# Patient Record
(demographics unavailable — no encounter records)

---

## 2025-02-04 NOTE — PHYSICAL EXAM
[de-identified] : left knee exam  Skin: Clean, dry, intact Inspection: No obvious malalignment, no masses, no swelling, no effusion. Tenderness: no MJLT. No LJLT. No tenderness over the medial and lateral patella facets. No ttp medial/lateral epicondyle, patella tendon, tibial tubercle, pes anserinus, or proximal fibula. ROM: 0 to 120 no pain with deep flexion in both knees Stability: Stable to varus, valgus, anterior and posterior drawer Strength: 5/5 Q/H/TA/GS/EHL, no atrophy Neuro: In tact to light touch throughout in dp/sp/tib/denise/saph nerve districutions, DTR's normal Pulses: 2+ DP/PT pulses.  right knee exam Skin: Clean, dry, intact Inspection: No obvious malalignment, no masses, no swelling, no effusion. Tenderness: no MJLT. No LJLT. No tenderness over the medial and lateral patella facets. No ttp medial/lateral epicondyle, patella tendon, tibial tubercle, pes anserinus, or proximal fibula. ROM: 0 to 120 no pain with deep flexion in both knees Stability: Stable to varus, valgus, anterior and posterior drawer Strength: 5/5 Q/H/TA/GS/EHL, no atrophy Neuro: In tact to light touch throughout in dp/sp/tib/denise/saph nerve districutions, DTR's normal Pulses: 2+ DP/PT pulses

## 2025-02-04 NOTE — HISTORY OF PRESENT ILLNESS
[de-identified] : 61yo F s/p Left total knee arthroplasty, 6/6/22, R tka 9/20/21.  In terms of her right knee she was doing very well.  No complaints.  In terms of her left knee she does report some pain and swelling.  She has returned to work.  She wears compression sleeves which she does find helpful.  She denies numbness tingling fevers chills [employer] case recently closed

## 2025-02-04 NOTE — DISCUSSION/SUMMARY
[de-identified] : History of total knee arthroplasty. [facility] paperwork was completed at the last visit.  Patient presents today requesting a letter.  A letter was provided.  She may follow-up for annual checkup

## 2025-04-08 NOTE — HISTORY OF PRESENT ILLNESS
[FreeTextEntry1] : Pt presented for follow up after she went to ED for LBP with radiation to the front and down LEs. [de-identified] : She went to ED for eval of low back pain.  She had labs done and CT scan, they were unremarkable.  She was discharged home with Meloxicam and Flexeril, it did help with the pain.  However, the Flexeril made her sleepy.  She had LBP on & off for about a year.  The LBP was worse over the last few days.  No recent fall or injury.   She denied paresthesia or weakness of LEs.  She was concerned about abnormal labs.  Labs from ED showed nonfasting glucose was 112, CO2 was 21& Alk Phos was 130.  The rest of CMP, CBC and UA were all normal.  She has appt with spine/ortho today in the afternoon.

## 2025-04-08 NOTE — PHYSICAL EXAM
[No Acute Distress] : no acute distress [Well Nourished] : well nourished [Well Developed] : well developed [Supple] : supple [No Respiratory Distress] : no respiratory distress  [Clear to Auscultation] : lungs were clear to auscultation bilaterally [Normal Rate] : normal rate  [Regular Rhythm] : with a regular rhythm [Normal S1, S2] : normal S1 and S2 [No Edema] : there was no peripheral edema [Soft] : abdomen soft [Non Tender] : non-tender [Normal Bowel Sounds] : normal bowel sounds [No CVA Tenderness] : no CVA  tenderness [No Spinal Tenderness] : no spinal tenderness [No Joint Swelling] : no joint swelling [Grossly Normal Strength/Tone] : grossly normal strength/tone [Normal Gait] : normal gait [Speech Grossly Normal] : speech grossly normal [Alert and Oriented x3] : oriented to person, place, and time [de-identified] : Obese female in stated age,

## 2025-04-08 NOTE — PHYSICAL EXAM
[No Acute Distress] : no acute distress [Well Nourished] : well nourished [Well Developed] : well developed [Supple] : supple [No Respiratory Distress] : no respiratory distress  [Clear to Auscultation] : lungs were clear to auscultation bilaterally [Normal Rate] : normal rate  [Regular Rhythm] : with a regular rhythm [Normal S1, S2] : normal S1 and S2 [No Edema] : there was no peripheral edema [Soft] : abdomen soft [Non Tender] : non-tender [Normal Bowel Sounds] : normal bowel sounds [No CVA Tenderness] : no CVA  tenderness [No Spinal Tenderness] : no spinal tenderness [No Joint Swelling] : no joint swelling [Grossly Normal Strength/Tone] : grossly normal strength/tone [Normal Gait] : normal gait [Speech Grossly Normal] : speech grossly normal [Alert and Oriented x3] : oriented to person, place, and time [de-identified] : Obese female in stated age,

## 2025-04-08 NOTE — HISTORY OF PRESENT ILLNESS
[FreeTextEntry1] : Pt presented for follow up after she went to ED for LBP with radiation to the front and down LEs. [de-identified] : She went to ED for eval of low back pain.  She had labs done and CT scan, they were unremarkable.  She was discharged home with Meloxicam and Flexeril, it did help with the pain.  However, the Flexeril made her sleepy.  She had LBP on & off for about a year.  The LBP was worse over the last few days.  No recent fall or injury.   She denied paresthesia or weakness of LEs.  She was concerned about abnormal labs.  Labs from ED showed nonfasting glucose was 112, CO2 was 21& Alk Phos was 130.  The rest of CMP, CBC and UA were all normal.  She has appt with spine/ortho today in the afternoon.